# Patient Record
Sex: FEMALE | Race: WHITE
[De-identification: names, ages, dates, MRNs, and addresses within clinical notes are randomized per-mention and may not be internally consistent; named-entity substitution may affect disease eponyms.]

---

## 2018-06-22 ENCOUNTER — HOSPITAL ENCOUNTER (EMERGENCY)
Dept: HOSPITAL 74 - FER | Age: 73
Discharge: ANOTHER HOSPITAL | End: 2018-06-22
Payer: MEDICARE

## 2018-06-22 VITALS — HEART RATE: 85 BPM | DIASTOLIC BLOOD PRESSURE: 71 MMHG | SYSTOLIC BLOOD PRESSURE: 150 MMHG

## 2018-06-22 VITALS — TEMPERATURE: 98.4 F

## 2018-06-22 VITALS — BODY MASS INDEX: 21.4 KG/M2

## 2018-06-22 DIAGNOSIS — I21.01: Primary | ICD-10-CM

## 2018-06-22 DIAGNOSIS — I10: ICD-10-CM

## 2018-06-22 DIAGNOSIS — E78.00: ICD-10-CM

## 2018-06-22 DIAGNOSIS — E11.9: ICD-10-CM

## 2018-06-22 LAB
ALBUMIN SERPL-MCNC: 2.8 G/DL (ref 3.5–5)
ALP SERPL-CCNC: 136 U/L (ref 32–92)
ALT SERPL-CCNC: 13 U/L (ref 10–40)
ANION GAP SERPL CALC-SCNC: 8 MMOL/L (ref 8–16)
AST SERPL-CCNC: 17 U/L (ref 10–42)
BASOPHILS # BLD: 0.1 % (ref 0–2)
BILIRUB SERPL-MCNC: 0.4 MG/DL (ref 0.2–1)
BUN SERPL-MCNC: 46 MG/DL (ref 7–18)
CALCIUM SERPL-MCNC: 8 MG/DL (ref 8.4–10.2)
CHLORIDE SERPL-SCNC: 113 MMOL/L (ref 98–107)
CO2 SERPL-SCNC: 20 MMOL/L (ref 22–28)
CREAT SERPL-MCNC: 3.3 MG/DL (ref 0.6–1.3)
DEPRECATED RDW RBC AUTO: 14.9 % (ref 11.6–15.6)
EOSINOPHIL # BLD: 2.1 % (ref 0–4.5)
GLUCOSE SERPL-MCNC: 253 MG/DL (ref 74–106)
HCT VFR BLD CALC: 23.3 % (ref 32.4–45.2)
HGB BLD-MCNC: 7.5 GM/DL (ref 10.7–15.3)
INR BLD: 1.81 (ref 0.82–1.09)
LYMPHOCYTES # BLD: 13.3 % (ref 8–40)
MCH RBC QN AUTO: 27.6 PG (ref 25.7–33.7)
MCHC RBC AUTO-ENTMCNC: 32.3 G/DL (ref 32–36)
MCV RBC: 85.2 FL (ref 80–96)
MONOCYTES # BLD AUTO: 4.8 % (ref 3.8–10.2)
NEUTROPHILS # BLD: 79.7 % (ref 42.8–82.8)
PLATELET # BLD AUTO: 432 K/MM3 (ref 134–434)
PMV BLD: 6.9 FL (ref 7.5–11.1)
POTASSIUM SERPLBLD-SCNC: 4.3 MMOL/L (ref 3.5–5.1)
PROT SERPL-MCNC: 6.6 G/DL (ref 6.4–8.3)
PT PNL PPP: 20 SEC (ref 10.2–13)
RBC # BLD AUTO: 2.73 M/MM3 (ref 3.6–5.2)
SODIUM SERPL-SCNC: 141 MMOL/L (ref 136–145)
WBC # BLD AUTO: 12 K/MM3 (ref 4–10.8)

## 2018-06-22 PROCEDURE — 3E033GC INTRODUCTION OF OTHER THERAPEUTIC SUBSTANCE INTO PERIPHERAL VEIN, PERCUTANEOUS APPROACH: ICD-10-PCS

## 2018-06-22 PROCEDURE — 3E033NZ INTRODUCTION OF ANALGESICS, HYPNOTICS, SEDATIVES INTO PERIPHERAL VEIN, PERCUTANEOUS APPROACH: ICD-10-PCS

## 2018-06-22 NOTE — PDOC
History of Present Illness





- General


Chief Complaint: Pain


Stated Complaint: PAIN


History Source: Patient


Exam Limitations: No Limitations





- History of Present Illness


Initial Comments: 





06/23/18 06:40


cp x 1 week, worsening x 2 hours


cp lorena, tightness


radiating to rUE and head


has not taken anything for this pain


Timing/Duration: 1 week


Severity: severe


Modifying Factors: worse with: medication, movement


Associated Symptoms: reports: chest pain, headaches.  denies: fever/chills, 

nausea/vomiting


Aspirin Received prior to arrival: Yes: no aspirin today





Past History





- Past Medical History


Allergies/Adverse Reactions: 


 Allergies











Allergy/AdvReac Type Severity Reaction Status Date / Time


 


codeine Allergy Unknown  Verified 11/25/13 08:28











Home Medications: 


Ambulatory Orders





Hydralazine HCl 100 mg PO BID 07/22/13 


Cholecalciferol (Vitamin D3) [Vitamin D] 1,000 unit PO DAILY 11/21/13 


Cyanocobalamin [Vitamin B12 -] 1,000 mcg PO DAILY 11/21/13 


Insulin Glargine,Hum.rec.anlog [Lantus Solostar PEN -] 12 SQ ACBK 11/21/13 


Lisinopril [Prinivil] 10 mg PO DAILY 11/21/13 


Metoprolol Succinate [Toprol XL -] 50 mg PO BID 11/21/13 


Naproxen Sodium [Aleve] 220 mg PO BID PRN 11/21/13 


Acetaminophen [Tylenol .Regular Strength -] 650 mg PO PRN PRN #100 tablet 11/25/ 13 








Diabetes: Yes (TYPE II)


GI Disorders: No


 Disorders: No


HTN: Yes


Hypercholesterolemia: Yes


Kidney Stones: No


Liver Disease: No


Thyroid Disease: No





- Surgical History


Orthopedic Surgery: Yes





- Suicide/Smoking/Psychosocial Hx


Smoking Status: No


Smoking History: Never smoked


Have you smoked in the past 12 months: No


Number of Cigarettes Smoked Daily: 0


Hx Alcohol Use: No


Drug/Substance Use Hx: No


Substance Use Type: None





**Review of Systems





- Review of Systems


All Other Systems: Reviewed and Negative





*Physical Exam





- Physical Exam


General Appearance: Yes: Nourished, Appropriately Dressed


HEENT: positive: EOMI, Other (exotropia)


Neck: negative: Lymphadenopathy (R)


Respiratory/Chest: positive: Lungs Clear


Cardiovascular: positive: Regular Rhythm


Gastrointestinal/Abdominal: negative: Tender


Lymphatic: negative: Adenopathy


Musculoskeletal: positive: Normal Inspection


Extremity: positive: Normal Capillary Refill


Integumentary: positive: Normal Color


Neurologic: positive: Fully Oriented





**Heart Score/ECG Review





- History


History: Highly suspicious





- Electrocardiogram


EKG: Significant ST-depression





- Age


Age: >/= 67





- Risk Factors


Risk Factors Heart Score: Yes Hx Diabetes





- Troponin


Troponin: </= normal limit





- ECG Intrepretation


Rhythm: Regular Rhythm





- ST and T


ST Elevation Suggest: Acute Myocardial Infarct





- ECG Impressions


Acute Myocardial Infarction: Anterior





ED Treatment Course





- LABORATORY


CBC & Chemistry Diagram: 


 06/22/18 22:30





 06/22/18 22:30





Medical Decision Making





- Critical Care Time


Total Critical Care Time (minutes): 90


Critical Care Statement: The care of this patient involved high complexity 

decision making to prevent further life threatening deterioration of the patient

's condition and/or to evaluate & treat vital organ system(s) failure or risk 

of failure.





- Medical Decision Making





06/23/18 06:44


Hx + EKG concerning for L main STEMI


? dissection: BP essentially = in [facility]


Cath team activated at [facility] (Dr. Santo accepting)


No Bleeding hx 


Anti-platelets and ac initiated


IV morphine for analgesia


transfer arranged








*DC/Admit/Observation/Transfer


Diagnosis at time of Disposition: 


STEMI (ST elevation myocardial infarction)


Qualifiers:


 Involved coronary artery: left main coronary artery Qualified Code(s): I21.01 

- ST elevation (STEMI) myocardial infarction involving left main coronary artery








- Discharge Dispostion


Disposition: TRANSFER ACUTE CARE/OTHER HOSP


Condition at time of disposition: Stable





- Referrals





- Patient Instructions





- Post Discharge Activity





- Transfer to Acute Care Facility


Receiving Facility: [facility].


Accepting Physician:: gladys

## 2018-06-27 NOTE — EKG
Test Reason : 

Blood Pressure : ***/*** mmHG

Vent. Rate : 086 BPM     Atrial Rate : 086 BPM

   P-R Int : 170 ms          QRS Dur : 146 ms

    QT Int : 484 ms       P-R-T Axes : 050 110 -74 degrees

   QTc Int : 579 ms

 

SINUS RHYTHM WITH OCCASIONAL PREMATURE VENTRICULAR COMPLEXES

POSSIBLE LEFT ATRIAL ENLARGEMENT

RIGHT BUNDLE BRANCH BLOCK

LEFT POSTERIOR FASCICULAR BLOCK

*** BIFASCICULAR BLOCK ***

T WAVE ABNORMALITY, CONSIDER INFEROLATERAL ISCHEMIA

ABNORMAL ECG

NO PREVIOUS ECGS AVAILABLE

Confirmed by LIZ ROCHE, JOHANNA ([id number]) on 6/27/2018 1:08:20 PM

 

Referred By: MD LOPEZ           Confirmed By:JOHANNA HILL MD